# Patient Record
Sex: MALE | Race: WHITE | NOT HISPANIC OR LATINO | URBAN - METROPOLITAN AREA
[De-identification: names, ages, dates, MRNs, and addresses within clinical notes are randomized per-mention and may not be internally consistent; named-entity substitution may affect disease eponyms.]

---

## 2017-07-13 ENCOUNTER — IMPORTED ENCOUNTER (OUTPATIENT)
Dept: URBAN - METROPOLITAN AREA CLINIC 50 | Facility: CLINIC | Age: 82
End: 2017-07-13

## 2017-07-17 ENCOUNTER — IMPORTED ENCOUNTER (OUTPATIENT)
Dept: URBAN - METROPOLITAN AREA CLINIC 50 | Facility: CLINIC | Age: 82
End: 2017-07-17

## 2017-07-17 NOTE — PATIENT DISCUSSION
"""Annual Type 2 Diabetic eye exam with dilation. Moderate diabetic retinopathy found. Macular edema is not present in the right eye. Recommend annual dilated examinations. Patient instructed to call office immediately if sudden changes in vision occur. Emphasized importance of good blood glucose control. Summary of care provided to the physician managing the ongoing diabetes care. """

## 2021-03-16 NOTE — PATIENT DISCUSSION
Order work-up including blood for FTA, ACE, Lysozyme, ASAF, RF, CBC with Diff, ESR, ANCA (P and C), and Lyme Titer.

## 2021-03-16 NOTE — PATIENT DISCUSSION
3/16/21 Rec to pt to start using preservative free artificial tears for dryness. Also rec pt to start using Pataday again bid OU to help with itching/allergies.

## 2021-03-16 NOTE — PATIENT DISCUSSION
Per [facility] inflamed pinguecula OD, consider excisional biopsy at pt's request. 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

## 2021-04-18 ASSESSMENT — VISUAL ACUITY
OS_CC: 20/20
OD_CC: 20/25

## 2021-04-18 ASSESSMENT — TONOMETRY
OD_IOP_MMHG: 18
OS_IOP_MMHG: 18

## 2022-10-02 NOTE — PATIENT DISCUSSION
Observe. You have been prescribed an opiate medication, oxycodone. Opiates can be addictive, even in small quantities. Take only what you need to bear your pain. Return any unused portion to the pharmacy from which you obtained it. Opiates may also be sedating. While this is not a problem under normal circumstances, when taken with alcohol or while driving or operating heavy machinery, this medicine could cause you to become too sleepy and/or hurt yourself or others. Therefore, it is very important that you DO NOT DRIVE, DRINK ALCOHOL, OR OPERATE HEAVY MACHINERY WHILE TAKING THE MEDICINE(S) LISTED ABOVE. Please make sure you call your PCP tomorrow to follow-up about the pancreatitis. Please make sure you discuss the need for an ultrasound with them.   Please return to the ED for any worsening symptoms